# Patient Record
(demographics unavailable — no encounter records)

---

## 2024-11-25 NOTE — ASSESSMENT
[FreeTextEntry1] : Patient presents for a vasectomy consultation. We discussed the vasectomy procedure at length. We discussed what to expect after the procedure and he will not have clearance until semen analysis is done. Patient expressed understanding. The patient signed the [facility] sterilization form on 11/20/24 and will be scheduled for vasectomy with local anesthesia and nitrous oxide after the obligatory 30-day waiting period. We discussed the importance of annual PSA determinations with [facility].

## 2024-11-25 NOTE — LETTER BODY
[Dear  ___] : Dear  [unfilled], [Consult Letter:] : I had the pleasure of evaluating your patient, [unfilled]. [Please see my note below.] : Please see my note below. [Consult Closing:] : Thank you very much for allowing me to participate in the care of this patient.  If you have any questions, please do not hesitate to contact me. [Sincerely,] : Sincerely, [FreeTextEntry3] : Aron

## 2024-11-25 NOTE — PHYSICAL EXAM
[Normal Appearance] : normal appearance [Well Groomed] : well groomed [General Appearance - In No Acute Distress] : no acute distress [Edema] : no peripheral edema [Respiration, Rhythm And Depth] : normal respiratory rhythm and effort [Exaggerated Use Of Accessory Muscles For Inspiration] : no accessory muscle use [Abdomen Soft] : soft [Abdomen Tenderness] : non-tender [Costovertebral Angle Tenderness] : no ~M costovertebral angle tenderness [Urinary Bladder Findings] : the bladder was normal on palpation [Normal Station and Gait] : the gait and station were normal for the patient's age [] : no rash [No Focal Deficits] : no focal deficits [Oriented To Time, Place, And Person] : oriented to person, place, and time [Affect] : the affect was normal [Mood] : the mood was normal [No Palpable Adenopathy] : no palpable adenopathy [Urethral Meatus] : meatus normal [Penis Abnormality] : normal uncircumcised penis [Scrotum] : the scrotum was normal [Epididymis] : the epididymides were normal [Testes Tenderness] : no tenderness of the testes

## 2024-11-25 NOTE — HISTORY OF PRESENT ILLNESS
[FreeTextEntry1] : Patient is a 49 year old  male who presents for a vasectomy consultation. He has fathered 2 children. Patient is voiding with an adequate stream. He denies nocturia, gross hematuria, dysuria, fever or flank pain. He reports adequate erections. PSA on 10/28/24 was 1.46 ng/ml; testosterone was 599.0 ng/dl.  He reports that his father had a treatment for his prostate but is unsure if it was cancer.

## 2024-11-25 NOTE — ADDENDUM
[FreeTextEntry1] : Next Visit: Vasectomy  Entered by Julianne Roy, acting as scribe and chaperone for Dr. Aron Coronado.   The documentation recorded by the scribe accurately reflects the service I personally performed and the decisions made by me.

## 2024-12-20 NOTE — LETTER BODY
[Dear  ___] : Dear  [unfilled], [Consult Letter:] : I had the pleasure of evaluating your patient, [unfilled]. [Please see my note below.] : Please see my note below. [Consult Closing:] : Thank you very much for allowing me to participate in the care of this patient.  If you have any questions, please do not hesitate to contact me. [Sincerely,] : Sincerely, [Courtesy Letter:] : I had the pleasure of seeing your patient, [unfilled], in my office today. [FreeTextEntry3] : Aron

## 2024-12-20 NOTE — HISTORY OF PRESENT ILLNESS
[FreeTextEntry1] : The patient presents for vasectomy today.  We once again discussed the procedure and expected post operative course and the patient would like to go ahead.

## 2024-12-20 NOTE — ASSESSMENT
[FreeTextEntry1] : The patient had an uneventful bilateral vasectomy today which she tolerated well.  He was given a scrotal supporter and was given detailed instructions and postvasectomy care.  If all is stable, he will come back in 1 month and will be set up for a semen analysis at that time.  He understands that he is not safe to have unprotected intercourse until a negative semen analysis comes back

## 2024-12-20 NOTE — ADDENDUM
[FreeTextEntry1] : Next Visit:   Entered by Julianne Roy, acting as scribe and chaperone for Dr. Aron Coronado.   The documentation recorded by the scribe accurately reflects the service I personally performed and the decisions made by me.

## 2025-02-03 NOTE — ASSESSMENT
[FreeTextEntry1] : Patient is stable clinically 1 month post vasectomy. We will give him an order and sterile cup for semen analysis. I did let him know that he must submit the sample to a lab within 1 hour of collection. He understands that he is not safe to have unprotected intercourse until semen analysis reveals azoospermia. If all remains stable, he will follow-up in 1 year for routine PSA determination and RADHA.

## 2025-02-03 NOTE — PHYSICAL EXAM
[Normal Appearance] : normal appearance [Well Groomed] : well groomed [General Appearance - In No Acute Distress] : no acute distress [Edema] : no peripheral edema [Respiration, Rhythm And Depth] : normal respiratory rhythm and effort [Exaggerated Use Of Accessory Muscles For Inspiration] : no accessory muscle use [Abdomen Soft] : soft [Costovertebral Angle Tenderness] : no ~M costovertebral angle tenderness [Abdomen Tenderness] : non-tender [Urinary Bladder Findings] : the bladder was normal on palpation [Normal Station and Gait] : the gait and station were normal for the patient's age [] : no rash [No Focal Deficits] : no focal deficits [Oriented To Time, Place, And Person] : oriented to person, place, and time [Affect] : the affect was normal [Mood] : the mood was normal [No Palpable Adenopathy] : no palpable adenopathy [Urethral Meatus] : meatus normal [Penis Abnormality] : normal uncircumcised penis [Scrotum] : the scrotum was normal [Epididymis] : the epididymides were normal [Testes Tenderness] : no tenderness of the testes [de-identified] : wound well healed, non tender

## 2025-02-03 NOTE — HISTORY OF PRESENT ILLNESS
[FreeTextEntry1] : Patient is 1 month status post vasectomy. He is voiding with an adequate stream, no nocturia, dysuria or hematuria. He has ejaculated 22 times since the procedure.

## 2025-02-03 NOTE — ADDENDUM
[FreeTextEntry1] : Next Visit: PVR, Uroflow, PSA, RADHA  Entered by Julianne Roy, acting as scribe and chaperone for Dr. Aron Coronado.   The documentation recorded by the scribe accurately reflects the service I personally performed and the decisions made by me.

## 2025-02-03 NOTE — LETTER BODY
[Dear  ___] : Dear  [unfilled], [Courtesy Letter:] : I had the pleasure of seeing your patient, [unfilled], in my office today. [Please see my note below.] : Please see my note below. [Consult Closing:] : Thank you very much for allowing me to participate in the care of this patient.  If you have any questions, please do not hesitate to contact me. [Sincerely,] : Sincerely, [FreeTextEntry3] : Aron